# Patient Record
Sex: FEMALE | Race: WHITE | Employment: OTHER | URBAN - NONMETROPOLITAN AREA
[De-identification: names, ages, dates, MRNs, and addresses within clinical notes are randomized per-mention and may not be internally consistent; named-entity substitution may affect disease eponyms.]

---

## 2019-06-20 ENCOUNTER — HOSPITAL ENCOUNTER (OUTPATIENT)
Dept: GENERAL RADIOLOGY | Age: 74
Discharge: HOME | End: 2019-06-20
Payer: MEDICARE

## 2019-06-20 ENCOUNTER — HOSPITAL ENCOUNTER (OUTPATIENT)
Age: 74
Discharge: HOME | End: 2019-06-20
Payer: MEDICARE

## 2019-06-20 DIAGNOSIS — M25.562 PAIN IN BOTH KNEES, UNSPECIFIED CHRONICITY: ICD-10-CM

## 2019-06-20 DIAGNOSIS — M25.561 PAIN IN BOTH KNEES, UNSPECIFIED CHRONICITY: ICD-10-CM

## 2019-06-20 PROCEDURE — 73564 X-RAY EXAM KNEE 4 OR MORE: CPT

## 2020-10-26 ENCOUNTER — HOSPITAL ENCOUNTER (OUTPATIENT)
Dept: GENERAL RADIOLOGY | Age: 75
Discharge: HOME | End: 2020-10-26
Payer: MEDICARE

## 2020-10-26 ENCOUNTER — HOSPITAL ENCOUNTER (OUTPATIENT)
Age: 75
Discharge: HOME | End: 2020-10-26
Payer: MEDICARE

## 2020-10-26 PROCEDURE — 73502 X-RAY EXAM HIP UNI 2-3 VIEWS: CPT

## 2021-06-08 ENCOUNTER — HOSPITAL ENCOUNTER (OUTPATIENT)
Dept: WOMENS IMAGING | Age: 76
Discharge: HOME | End: 2021-06-08
Payer: MEDICARE

## 2021-06-08 DIAGNOSIS — Z12.31 VISIT FOR SCREENING MAMMOGRAM: ICD-10-CM

## 2021-06-08 DIAGNOSIS — Z78.0 POST-MENOPAUSAL: ICD-10-CM

## 2021-06-08 PROCEDURE — 77080 DXA BONE DENSITY AXIAL: CPT

## 2021-06-08 PROCEDURE — 77063 BREAST TOMOSYNTHESIS BI: CPT

## 2022-08-02 ENCOUNTER — HOSPITAL ENCOUNTER (OUTPATIENT)
Dept: MAMMOGRAPHY | Age: 77
Discharge: HOME | End: 2022-08-02
Payer: MEDICARE

## 2022-08-02 DIAGNOSIS — Z12.31 VISIT FOR SCREENING MAMMOGRAM: ICD-10-CM

## 2022-08-02 PROCEDURE — 77063 BREAST TOMOSYNTHESIS BI: CPT

## 2024-01-25 ENCOUNTER — HOSPITAL ENCOUNTER (OUTPATIENT)
Age: 79
Discharge: HOME | End: 2024-01-25
Payer: MEDICARE

## 2024-01-25 ENCOUNTER — HOSPITAL ENCOUNTER (OUTPATIENT)
Dept: GENERAL RADIOLOGY | Age: 79
Discharge: HOME | End: 2024-01-25
Payer: MEDICARE

## 2024-01-25 DIAGNOSIS — M25.561 PAIN IN BOTH KNEES, UNSPECIFIED CHRONICITY: ICD-10-CM

## 2024-01-25 DIAGNOSIS — M25.562 PAIN IN BOTH KNEES, UNSPECIFIED CHRONICITY: ICD-10-CM

## 2024-01-25 PROCEDURE — 73560 X-RAY EXAM OF KNEE 1 OR 2: CPT

## 2024-09-22 ENCOUNTER — HOSPITAL ENCOUNTER (OUTPATIENT)
Age: 79
Setting detail: OBSERVATION
Discharge: HOME | End: 2024-09-23
Attending: STUDENT IN AN ORGANIZED HEALTH CARE EDUCATION/TRAINING PROGRAM | Admitting: INTERNAL MEDICINE
Payer: MEDICARE

## 2024-09-22 ENCOUNTER — APPOINTMENT (OUTPATIENT)
Dept: CT IMAGING | Age: 79
End: 2024-09-22
Payer: MEDICARE

## 2024-09-22 ENCOUNTER — APPOINTMENT (OUTPATIENT)
Dept: GENERAL RADIOLOGY | Age: 79
End: 2024-09-22
Payer: MEDICARE

## 2024-09-22 ENCOUNTER — APPOINTMENT (OUTPATIENT)
Dept: MRI IMAGING | Age: 79
End: 2024-09-22
Payer: MEDICARE

## 2024-09-22 DIAGNOSIS — R47.81 SLURRED SPEECH: Primary | ICD-10-CM

## 2024-09-22 DIAGNOSIS — I63.9 CEREBROVASCULAR ACCIDENT (CVA), UNSPECIFIED MECHANISM (HCC): ICD-10-CM

## 2024-09-22 PROBLEM — R47.1 DYSARTHRIA: Status: ACTIVE | Noted: 2024-09-22

## 2024-09-22 LAB
ALBUMIN SERPL BCG-MCNC: 3.9 G/DL (ref 3.5–5.1)
ALP SERPL-CCNC: 64 U/L (ref 38–126)
ALT SERPL W/O P-5'-P-CCNC: 16 U/L (ref 11–66)
ANION GAP SERPL CALC-SCNC: 15 MEQ/L (ref 8–16)
APTT PPP: 29.8 SECONDS (ref 22–38)
AST SERPL-CCNC: 20 U/L (ref 5–40)
BASOPHILS ABSOLUTE: 0.1 THOU/MM3 (ref 0–0.1)
BASOPHILS NFR BLD AUTO: 0.5 %
BILIRUB SERPL-MCNC: 0.2 MG/DL (ref 0.3–1.2)
BUN SERPL-MCNC: 11 MG/DL (ref 7–22)
CALCIUM SERPL-MCNC: 9 MG/DL (ref 8.5–10.5)
CHLORIDE SERPL-SCNC: 93 MEQ/L (ref 98–111)
CO2 SERPL-SCNC: 22 MEQ/L (ref 23–33)
CREAT SERPL-MCNC: 0.9 MG/DL (ref 0.4–1.2)
DEPRECATED RDW RBC AUTO: 46.6 FL (ref 35–45)
EOSINOPHIL NFR BLD AUTO: 2.4 %
EOSINOPHILS ABSOLUTE: 0.3 THOU/MM3 (ref 0–0.4)
ERYTHROCYTE [DISTWIDTH] IN BLOOD BY AUTOMATED COUNT: 15.2 % (ref 11.5–14.5)
GFR SERPL CREATININE-BSD FRML MDRD: 66 ML/MIN/1.73M2
GLUCOSE BLD STRIP.AUTO-MCNC: 145 MG/DL (ref 70–108)
GLUCOSE SERPL-MCNC: 144 MG/DL (ref 70–108)
HCT VFR BLD AUTO: 36 % (ref 37–47)
HGB BLD-MCNC: 11.5 GM/DL (ref 12–16)
IMM GRANULOCYTES # BLD AUTO: 0.06 THOU/MM3 (ref 0–0.07)
IMM GRANULOCYTES NFR BLD AUTO: 0.5 %
INR PPP: 0.85 (ref 0.85–1.13)
LYMPHOCYTES ABSOLUTE: 2.2 THOU/MM3 (ref 1–4.8)
LYMPHOCYTES NFR BLD AUTO: 18.9 %
MCH RBC QN AUTO: 27.1 PG (ref 26–33)
MCHC RBC AUTO-ENTMCNC: 31.9 GM/DL (ref 32.2–35.5)
MCV RBC AUTO: 84.7 FL (ref 81–99)
MONOCYTES ABSOLUTE: 1 THOU/MM3 (ref 0.4–1.3)
MONOCYTES NFR BLD AUTO: 8.5 %
NEUTROPHILS ABSOLUTE: 8 THOU/MM3 (ref 1.8–7.7)
NEUTROPHILS NFR BLD AUTO: 69.2 %
NRBC BLD AUTO-RTO: 0 /100 WBC
NT-PROBNP SERPL IA-MCNC: 779.7 PG/ML (ref 0–449)
OSMOLALITY SERPL CALC.SUM OF ELEC: 262.7 MOSMOL/KG (ref 275–300)
OSMOLALITY SERPL: 271 MOSMOL/KG (ref 275–295)
PLATELET # BLD AUTO: 413 THOU/MM3 (ref 130–400)
PMV BLD AUTO: 10.2 FL (ref 9.4–12.4)
POTASSIUM SERPL-SCNC: 3.7 MEQ/L (ref 3.5–5.2)
PROT SERPL-MCNC: 6.6 G/DL (ref 6.1–8)
RBC # BLD AUTO: 4.25 MILL/MM3 (ref 4.2–5.4)
SODIUM SERPL-SCNC: 128 MEQ/L (ref 135–145)
SODIUM SERPL-SCNC: 130 MEQ/L (ref 135–145)
TROPONIN, HIGH SENSITIVITY: 8 NG/L (ref 0–12)
WBC # BLD AUTO: 11.5 THOU/MM3 (ref 4.8–10.8)

## 2024-09-22 PROCEDURE — 6370000000 HC RX 637 (ALT 250 FOR IP)

## 2024-09-22 PROCEDURE — 82948 REAGENT STRIP/BLOOD GLUCOSE: CPT

## 2024-09-22 PROCEDURE — 83930 ASSAY OF BLOOD OSMOLALITY: CPT

## 2024-09-22 PROCEDURE — 6370000000 HC RX 637 (ALT 250 FOR IP): Performed by: INTERNAL MEDICINE

## 2024-09-22 PROCEDURE — 99223 1ST HOSP IP/OBS HIGH 75: CPT | Performed by: INTERNAL MEDICINE

## 2024-09-22 PROCEDURE — 85610 PROTHROMBIN TIME: CPT

## 2024-09-22 PROCEDURE — 70498 CT ANGIOGRAPHY NECK: CPT

## 2024-09-22 PROCEDURE — 6360000004 HC RX CONTRAST MEDICATION: Performed by: STUDENT IN AN ORGANIZED HEALTH CARE EDUCATION/TRAINING PROGRAM

## 2024-09-22 PROCEDURE — 2580000003 HC RX 258: Performed by: STUDENT IN AN ORGANIZED HEALTH CARE EDUCATION/TRAINING PROGRAM

## 2024-09-22 PROCEDURE — 96361 HYDRATE IV INFUSION ADD-ON: CPT

## 2024-09-22 PROCEDURE — 71045 X-RAY EXAM CHEST 1 VIEW: CPT

## 2024-09-22 PROCEDURE — 70496 CT ANGIOGRAPHY HEAD: CPT

## 2024-09-22 PROCEDURE — 70551 MRI BRAIN STEM W/O DYE: CPT

## 2024-09-22 PROCEDURE — 84295 ASSAY OF SERUM SODIUM: CPT

## 2024-09-22 PROCEDURE — 70450 CT HEAD/BRAIN W/O DYE: CPT

## 2024-09-22 PROCEDURE — 93005 ELECTROCARDIOGRAM TRACING: CPT | Performed by: STUDENT IN AN ORGANIZED HEALTH CARE EDUCATION/TRAINING PROGRAM

## 2024-09-22 PROCEDURE — G0378 HOSPITAL OBSERVATION PER HR: HCPCS

## 2024-09-22 PROCEDURE — 80053 COMPREHEN METABOLIC PANEL: CPT

## 2024-09-22 PROCEDURE — 85730 THROMBOPLASTIN TIME PARTIAL: CPT

## 2024-09-22 PROCEDURE — 85025 COMPLETE CBC W/AUTO DIFF WBC: CPT

## 2024-09-22 PROCEDURE — 2580000003 HC RX 258

## 2024-09-22 PROCEDURE — 83880 ASSAY OF NATRIURETIC PEPTIDE: CPT

## 2024-09-22 PROCEDURE — 96360 HYDRATION IV INFUSION INIT: CPT

## 2024-09-22 PROCEDURE — 36415 COLL VENOUS BLD VENIPUNCTURE: CPT

## 2024-09-22 PROCEDURE — 99285 EMERGENCY DEPT VISIT HI MDM: CPT

## 2024-09-22 PROCEDURE — 84484 ASSAY OF TROPONIN QUANT: CPT

## 2024-09-22 RX ORDER — SODIUM CHLORIDE 9 MG/ML
INJECTION, SOLUTION INTRAVENOUS CONTINUOUS
Status: DISCONTINUED | OUTPATIENT
Start: 2024-09-22 | End: 2024-09-23

## 2024-09-22 RX ORDER — IOPAMIDOL 755 MG/ML
80 INJECTION, SOLUTION INTRAVASCULAR
Status: COMPLETED | OUTPATIENT
Start: 2024-09-22 | End: 2024-09-22

## 2024-09-22 RX ORDER — ALENDRONATE SODIUM 70 MG/1
70 TABLET ORAL WEEKLY
COMMUNITY
Start: 2024-07-12

## 2024-09-22 RX ORDER — 0.9 % SODIUM CHLORIDE 0.9 %
500 INTRAVENOUS SOLUTION INTRAVENOUS ONCE
Status: DISCONTINUED | OUTPATIENT
Start: 2024-09-22 | End: 2024-09-22

## 2024-09-22 RX ORDER — LISINOPRIL AND HYDROCHLOROTHIAZIDE 12.5; 2 MG/1; MG/1
1 TABLET ORAL DAILY
Status: ON HOLD | COMMUNITY
Start: 2024-07-12 | End: 2024-09-23

## 2024-09-22 RX ORDER — LEVOTHYROXINE SODIUM 50 UG/1
50 TABLET ORAL DAILY
COMMUNITY
Start: 2024-07-12

## 2024-09-22 RX ORDER — CLOPIDOGREL BISULFATE 75 MG/1
150 TABLET ORAL ONCE
Status: COMPLETED | OUTPATIENT
Start: 2024-09-22 | End: 2024-09-22

## 2024-09-22 RX ORDER — 0.9 % SODIUM CHLORIDE 0.9 %
1000 INTRAVENOUS SOLUTION INTRAVENOUS ONCE
Status: COMPLETED | OUTPATIENT
Start: 2024-09-22 | End: 2024-09-22

## 2024-09-22 RX ORDER — ENOXAPARIN SODIUM 100 MG/ML
40 INJECTION SUBCUTANEOUS DAILY
Status: DISCONTINUED | OUTPATIENT
Start: 2024-09-22 | End: 2024-09-23 | Stop reason: HOSPADM

## 2024-09-22 RX ORDER — ZOLPIDEM TARTRATE 10 MG/1
10 TABLET ORAL NIGHTLY PRN
COMMUNITY
Start: 2024-09-09

## 2024-09-22 RX ORDER — ACETAMINOPHEN 325 MG/1
650 TABLET ORAL EVERY 6 HOURS PRN
Status: DISCONTINUED | OUTPATIENT
Start: 2024-09-22 | End: 2024-09-23 | Stop reason: HOSPADM

## 2024-09-22 RX ORDER — ASPIRIN 81 MG/1
324 TABLET, CHEWABLE ORAL ONCE
Status: COMPLETED | OUTPATIENT
Start: 2024-09-22 | End: 2024-09-22

## 2024-09-22 RX ORDER — ATORVASTATIN CALCIUM 40 MG/1
40 TABLET, FILM COATED ORAL NIGHTLY
Status: DISCONTINUED | OUTPATIENT
Start: 2024-09-22 | End: 2024-09-23 | Stop reason: HOSPADM

## 2024-09-22 RX ADMIN — ATORVASTATIN CALCIUM 40 MG: 40 TABLET, FILM COATED ORAL at 19:39

## 2024-09-22 RX ADMIN — SODIUM CHLORIDE 1000 ML: 9 INJECTION, SOLUTION INTRAVENOUS at 07:25

## 2024-09-22 RX ADMIN — ACETAMINOPHEN 650 MG: 325 TABLET ORAL at 23:14

## 2024-09-22 RX ADMIN — ASPIRIN 324 MG: 81 TABLET, CHEWABLE ORAL at 07:25

## 2024-09-22 RX ADMIN — SODIUM CHLORIDE: 9 INJECTION, SOLUTION INTRAVENOUS at 07:24

## 2024-09-22 RX ADMIN — IOPAMIDOL 80 ML: 755 INJECTION, SOLUTION INTRAVENOUS at 06:54

## 2024-09-22 RX ADMIN — CLOPIDOGREL BISULFATE 150 MG: 75 TABLET ORAL at 07:24

## 2024-09-22 ASSESSMENT — PAIN - FUNCTIONAL ASSESSMENT
PAIN_FUNCTIONAL_ASSESSMENT: ACTIVITIES ARE NOT PREVENTED
PAIN_FUNCTIONAL_ASSESSMENT: NONE - DENIES PAIN
PAIN_FUNCTIONAL_ASSESSMENT: NONE - DENIES PAIN

## 2024-09-22 ASSESSMENT — PAIN DESCRIPTION - FREQUENCY: FREQUENCY: INTERMITTENT

## 2024-09-22 ASSESSMENT — PAIN DESCRIPTION - ORIENTATION: ORIENTATION: INNER

## 2024-09-22 ASSESSMENT — PAIN DESCRIPTION - PAIN TYPE: TYPE: ACUTE PAIN

## 2024-09-22 ASSESSMENT — PAIN DESCRIPTION - DESCRIPTORS: DESCRIPTORS: ACHING

## 2024-09-22 ASSESSMENT — PAIN SCALES - GENERAL
PAINLEVEL_OUTOF10: 0
PAINLEVEL_OUTOF10: 0
PAINLEVEL_OUTOF10: 3

## 2024-09-22 ASSESSMENT — PAIN DESCRIPTION - ONSET: ONSET: ON-GOING

## 2024-09-22 ASSESSMENT — PAIN DESCRIPTION - LOCATION: LOCATION: HEAD

## 2024-09-23 VITALS
SYSTOLIC BLOOD PRESSURE: 139 MMHG | BODY MASS INDEX: 31.04 KG/M2 | HEIGHT: 66 IN | HEART RATE: 77 BPM | DIASTOLIC BLOOD PRESSURE: 70 MMHG | OXYGEN SATURATION: 100 % | RESPIRATION RATE: 16 BRPM | TEMPERATURE: 98.6 F | WEIGHT: 193.12 LBS

## 2024-09-23 PROBLEM — G47.00 INSOMNIA: Status: ACTIVE | Noted: 2024-09-23

## 2024-09-23 PROBLEM — R25.1 TREMOR: Status: ACTIVE | Noted: 2024-09-23

## 2024-09-23 PROBLEM — I10 PRIMARY HYPERTENSION: Status: ACTIVE | Noted: 2024-09-23

## 2024-09-23 LAB
ANION GAP SERPL CALC-SCNC: 9 MEQ/L (ref 8–16)
BUN SERPL-MCNC: 8 MG/DL (ref 7–22)
CALCIUM SERPL-MCNC: 8.8 MG/DL (ref 8.5–10.5)
CHLORIDE SERPL-SCNC: 101 MEQ/L (ref 98–111)
CHOLEST SERPL-MCNC: 194 MG/DL (ref 100–199)
CO2 SERPL-SCNC: 23 MEQ/L (ref 23–33)
CREAT SERPL-MCNC: 0.7 MG/DL (ref 0.4–1.2)
DEPRECATED MEAN GLUCOSE BLD GHB EST-ACNC: 114 MG/DL (ref 70–126)
DEPRECATED RDW RBC AUTO: 47.8 FL (ref 35–45)
EKG ATRIAL RATE: 105 BPM
EKG P AXIS: 61 DEGREES
EKG P-R INTERVAL: 168 MS
EKG Q-T INTERVAL: 334 MS
EKG QRS DURATION: 88 MS
EKG QTC CALCULATION (BAZETT): 441 MS
EKG R AXIS: 78 DEGREES
EKG T AXIS: 45 DEGREES
EKG VENTRICULAR RATE: 105 BPM
ERYTHROCYTE [DISTWIDTH] IN BLOOD BY AUTOMATED COUNT: 15.3 % (ref 11.5–14.5)
GFR SERPL CREATININE-BSD FRML MDRD: 89 ML/MIN/1.73M2
GLUCOSE SERPL-MCNC: 89 MG/DL (ref 70–108)
HBA1C MFR BLD HPLC: 5.8 % (ref 4.4–6.4)
HCT VFR BLD AUTO: 33.6 % (ref 37–47)
HDLC SERPL-MCNC: 50 MG/DL
HGB BLD-MCNC: 10.6 GM/DL (ref 12–16)
LDLC SERPL CALC-MCNC: 123 MG/DL
MCH RBC QN AUTO: 27 PG (ref 26–33)
MCHC RBC AUTO-ENTMCNC: 31.5 GM/DL (ref 32.2–35.5)
MCV RBC AUTO: 85.7 FL (ref 81–99)
PLATELET # BLD AUTO: 303 THOU/MM3 (ref 130–400)
PMV BLD AUTO: 10.9 FL (ref 9.4–12.4)
POTASSIUM SERPL-SCNC: 4.2 MEQ/L (ref 3.5–5.2)
RBC # BLD AUTO: 3.92 MILL/MM3 (ref 4.2–5.4)
SODIUM SERPL-SCNC: 133 MEQ/L (ref 135–145)
TRIGL SERPL-MCNC: 104 MG/DL (ref 0–199)
TSH SERPL DL<=0.005 MIU/L-ACNC: 1.36 UIU/ML (ref 0.4–4.2)
WBC # BLD AUTO: 5.6 THOU/MM3 (ref 4.8–10.8)

## 2024-09-23 PROCEDURE — 85027 COMPLETE CBC AUTOMATED: CPT

## 2024-09-23 PROCEDURE — 99239 HOSP IP/OBS DSCHRG MGMT >30: CPT

## 2024-09-23 PROCEDURE — 83036 HEMOGLOBIN GLYCOSYLATED A1C: CPT

## 2024-09-23 PROCEDURE — 36415 COLL VENOUS BLD VENIPUNCTURE: CPT

## 2024-09-23 PROCEDURE — 96361 HYDRATE IV INFUSION ADD-ON: CPT

## 2024-09-23 PROCEDURE — 80061 LIPID PANEL: CPT

## 2024-09-23 PROCEDURE — G0378 HOSPITAL OBSERVATION PER HR: HCPCS

## 2024-09-23 PROCEDURE — 80048 BASIC METABOLIC PNL TOTAL CA: CPT

## 2024-09-23 PROCEDURE — 93010 ELECTROCARDIOGRAM REPORT: CPT | Performed by: INTERNAL MEDICINE

## 2024-09-23 PROCEDURE — 84443 ASSAY THYROID STIM HORMONE: CPT

## 2024-09-23 RX ORDER — CLOPIDOGREL BISULFATE 75 MG/1
75 TABLET ORAL DAILY
Qty: 30 TABLET | Refills: 3 | Status: CANCELLED | OUTPATIENT
Start: 2024-09-23

## 2024-09-23 RX ORDER — LISINOPRIL AND HYDROCHLOROTHIAZIDE 12.5; 2 MG/1; MG/1
1 TABLET ORAL DAILY
Qty: 30 TABLET | Refills: 0 | Status: SHIPPED | OUTPATIENT
Start: 2024-09-23

## 2024-09-23 RX ORDER — ASPIRIN 325 MG
325 TABLET ORAL EVERY 6 HOURS PRN
Status: ON HOLD | COMMUNITY
End: 2024-09-23 | Stop reason: HOSPADM

## 2024-09-23 RX ORDER — ASPIRIN 81 MG/1
81 TABLET ORAL DAILY
Qty: 90 TABLET | Refills: 0 | Status: CANCELLED | OUTPATIENT
Start: 2024-09-23

## 2024-09-23 RX ORDER — ATORVASTATIN CALCIUM 40 MG/1
40 TABLET, FILM COATED ORAL NIGHTLY
Qty: 30 TABLET | Refills: 3 | Status: CANCELLED | OUTPATIENT
Start: 2024-09-23

## 2024-09-23 ASSESSMENT — PAIN SCALES - GENERAL: PAINLEVEL_OUTOF10: 0

## 2024-10-08 ENCOUNTER — TRANSCRIBE ORDERS (OUTPATIENT)
Dept: ADMINISTRATIVE | Age: 79
End: 2024-10-08

## 2024-10-08 DIAGNOSIS — R47.1 DYSARTHRIA: ICD-10-CM

## 2024-10-08 DIAGNOSIS — R55 SYNCOPE, UNSPECIFIED SYNCOPE TYPE: Primary | ICD-10-CM

## 2024-10-28 ENCOUNTER — HOSPITAL ENCOUNTER (OUTPATIENT)
Age: 79
Discharge: HOME | End: 2024-10-30
Attending: FAMILY MEDICINE
Payer: MEDICARE

## 2024-10-28 DIAGNOSIS — R47.1 DYSARTHRIA: ICD-10-CM

## 2024-10-28 DIAGNOSIS — R55 SYNCOPE, UNSPECIFIED SYNCOPE TYPE: ICD-10-CM

## 2024-10-28 LAB
ECHO AO ASC DIAM: 3.9 CM
ECHO AO SINUS VALSALVA DIAM: 3 CM
ECHO AO ST JNCT DIAM: 2.6 CM
ECHO AV CUSP MM: 1.9 CM
ECHO AV MEAN GRADIENT: 2 MMHG
ECHO AV MEAN VELOCITY: 0.7 M/S
ECHO AV PEAK GRADIENT: 5 MMHG
ECHO AV PEAK VELOCITY: 1.1 M/S
ECHO AV VELOCITY RATIO: 0.73
ECHO AV VTI: 23.3 CM
ECHO EST RA PRESSURE: 3 MMHG
ECHO LA AREA 4C: 16.7 CM2
ECHO LA DIAMETER: 3.7 CM
ECHO LA MAJOR AXIS: 4.9 CM
ECHO LA VOL MOD A4C: 42 ML (ref 22–52)
ECHO LV E' LATERAL VELOCITY: 7.5 CM/S
ECHO LV E' SEPTAL VELOCITY: 6.5 CM/S
ECHO LV EDV A4C: 70 ML
ECHO LV EJECTION FRACTION A4C: 49 %
ECHO LV EJECTION FRACTION BIPLANE: 50 % (ref 55–100)
ECHO LV ESV A4C: 36 ML
ECHO LV FRACTIONAL SHORTENING: 23 % (ref 28–44)
ECHO LV INTERNAL DIMENSION DIASTOLIC: 4.8 CM (ref 3.9–5.3)
ECHO LV INTERNAL DIMENSION SYSTOLIC: 3.7 CM
ECHO LV ISOVOLUMETRIC RELAXATION TIME (IVRT): 95 MS
ECHO LV IVSD: 1.2 CM (ref 0.6–0.9)
ECHO LV MASS 2D: 219.1 G (ref 67–162)
ECHO LV POSTERIOR WALL DIASTOLIC: 1.2 CM (ref 0.6–0.9)
ECHO LV RELATIVE WALL THICKNESS RATIO: 0.5
ECHO LVOT AV VTI INDEX: 0.81
ECHO LVOT MEAN GRADIENT: 1 MMHG
ECHO LVOT PEAK GRADIENT: 2 MMHG
ECHO LVOT PEAK VELOCITY: 0.8 M/S
ECHO LVOT VTI: 18.9 CM
ECHO MV A VELOCITY: 0.89 M/S
ECHO MV E DECELERATION TIME (DT): 169 MS
ECHO MV E VELOCITY: 0.53 M/S
ECHO MV E/A RATIO: 0.6
ECHO MV E/E' LATERAL: 7.07
ECHO MV E/E' RATIO (AVERAGED): 7.61
ECHO MV E/E' SEPTAL: 8.15
ECHO MV REGURGITANT PEAK GRADIENT: 52 MMHG
ECHO MV REGURGITANT PEAK VELOCITY: 3.6 M/S
ECHO PV MAX VELOCITY: 0.7 M/S
ECHO PV PEAK GRADIENT: 2 MMHG
ECHO RIGHT VENTRICULAR SYSTOLIC PRESSURE (RVSP): 25 MMHG
ECHO RV FREE WALL PEAK S': 15.9 CM/S
ECHO RV INTERNAL DIMENSION: 3.2 CM
ECHO RV TAPSE: 1.7 CM (ref 1.7–?)
ECHO TV E WAVE: 0.4 M/S
ECHO TV REGURGITANT MAX VELOCITY: 2.36 M/S
ECHO TV REGURGITANT PEAK GRADIENT: 22 MMHG

## 2024-10-28 PROCEDURE — 93306 TTE W/DOPPLER COMPLETE: CPT

## 2024-10-28 PROCEDURE — 93270 REMOTE 30 DAY ECG REV/REPORT: CPT

## 2024-11-22 ENCOUNTER — HOSPITAL ENCOUNTER (OUTPATIENT)
Dept: NUCLEAR MEDICINE | Age: 79
Discharge: HOME | End: 2024-11-22
Attending: FAMILY MEDICINE
Payer: MEDICARE

## 2024-11-22 ENCOUNTER — HOSPITAL ENCOUNTER (OUTPATIENT)
Age: 79
End: 2024-11-22
Attending: FAMILY MEDICINE
Payer: MEDICARE

## 2024-11-22 VITALS — BODY MASS INDEX: 34.55 KG/M2 | HEIGHT: 60 IN | WEIGHT: 176 LBS

## 2024-11-22 DIAGNOSIS — R94.31 ABNORMAL ELECTROCARDIOGRAPHY: ICD-10-CM

## 2024-11-22 LAB
ECHO BSA: 1.84 M2
NUC STRESS EJECTION FRACTION: 48 %
STRESS BASELINE DIAS BP: 71 MMHG
STRESS BASELINE HR: 78 BPM
STRESS BASELINE SYS BP: 152 MMHG
STRESS ESTIMATED WORKLOAD: 1 METS
STRESS PEAK DIAS BP: 69 MMHG
STRESS PEAK SYS BP: 155 MMHG
STRESS PERCENT HR ACHIEVED: 74 %
STRESS POST PEAK HR: 106 BPM
STRESS RATE PRESSURE PRODUCT: NORMAL BPM*MMHG
STRESS STAGE 1 BP: NORMAL MMHG
STRESS STAGE 1 DURATION: 1 MIN:SEC
STRESS STAGE 1 HR: 96 BPM
STRESS STAGE 2 BP: NORMAL MMHG
STRESS STAGE 2 DURATION: 1 MIN:SEC
STRESS STAGE 2 HR: 103 BPM
STRESS STAGE 3 BP: NORMAL MMHG
STRESS STAGE 3 DURATION: 1 MIN:SEC
STRESS STAGE 3 HR: 96 BPM
STRESS STAGE RECOVERY 1 BP: NORMAL MMHG
STRESS STAGE RECOVERY 1 DURATION: 1 MIN:SEC
STRESS STAGE RECOVERY 1 HR: 96 BPM
STRESS STAGE RECOVERY 2 BP: NORMAL MMHG
STRESS STAGE RECOVERY 2 DURATION: 1 MIN:SEC
STRESS STAGE RECOVERY 2 HR: 93 BPM
STRESS STAGE RECOVERY 3 BP: NORMAL MMHG
STRESS STAGE RECOVERY 3 DURATION: 1 MIN:SEC
STRESS STAGE RECOVERY 3 HR: 92 BPM
STRESS STAGE RECOVERY 4 BP: NORMAL MMHG
STRESS STAGE RECOVERY 4 DURATION: 2 MIN:SEC
STRESS STAGE RECOVERY 4 HR: 93 BPM
STRESS TARGET HR: 143 BPM

## 2024-11-22 PROCEDURE — 93017 CV STRESS TEST TRACING ONLY: CPT

## 2024-11-22 PROCEDURE — 3430000000 HC RX DIAGNOSTIC RADIOPHARMACEUTICAL: Performed by: FAMILY MEDICINE

## 2024-11-22 PROCEDURE — 6360000002 HC RX W HCPCS: Performed by: FAMILY MEDICINE

## 2024-11-22 PROCEDURE — 78452 HT MUSCLE IMAGE SPECT MULT: CPT

## 2024-11-22 PROCEDURE — A9500 TC99M SESTAMIBI: HCPCS | Performed by: FAMILY MEDICINE

## 2024-11-22 RX ORDER — TETRAKIS(2-METHOXYISOBUTYLISOCYANIDE)COPPER(I) TETRAFLUOROBORATE 1 MG/ML
10 INJECTION, POWDER, LYOPHILIZED, FOR SOLUTION INTRAVENOUS
Status: COMPLETED | OUTPATIENT
Start: 2024-11-22 | End: 2024-11-22

## 2024-11-22 RX ORDER — TETRAKIS(2-METHOXYISOBUTYLISOCYANIDE)COPPER(I) TETRAFLUOROBORATE 1 MG/ML
10 INJECTION, POWDER, LYOPHILIZED, FOR SOLUTION INTRAVENOUS
Status: DISCONTINUED | OUTPATIENT
Start: 2024-11-22 | End: 2024-11-22

## 2024-11-22 RX ORDER — TETRAKIS(2-METHOXYISOBUTYLISOCYANIDE)COPPER(I) TETRAFLUOROBORATE 1 MG/ML
32.3 INJECTION, POWDER, LYOPHILIZED, FOR SOLUTION INTRAVENOUS
Status: COMPLETED | OUTPATIENT
Start: 2024-11-22 | End: 2024-11-22

## 2024-11-22 RX ORDER — TETRAKIS(2-METHOXYISOBUTYLISOCYANIDE)COPPER(I) TETRAFLUOROBORATE 1 MG/ML
32.3 INJECTION, POWDER, LYOPHILIZED, FOR SOLUTION INTRAVENOUS
Status: DISCONTINUED | OUTPATIENT
Start: 2024-11-22 | End: 2024-11-22

## 2024-11-22 RX ORDER — REGADENOSON 0.08 MG/ML
0.4 INJECTION, SOLUTION INTRAVENOUS
Status: COMPLETED | OUTPATIENT
Start: 2024-11-22 | End: 2024-11-22

## 2024-11-22 RX ADMIN — Medication 32.3 MILLICURIE: at 10:37

## 2024-11-22 RX ADMIN — REGADENOSON 0.4 MG: 0.08 INJECTION, SOLUTION INTRAVENOUS at 10:35

## 2024-11-22 RX ADMIN — Medication 10 MILLICURIE: at 09:40

## 2024-12-10 ENCOUNTER — HOSPITAL ENCOUNTER (OUTPATIENT)
Dept: PHYSICAL THERAPY | Age: 79
Setting detail: THERAPIES SERIES
Discharge: HOME | End: 2024-12-10
Payer: MEDICARE

## 2024-12-10 PROCEDURE — 97161 PT EVAL LOW COMPLEX 20 MIN: CPT

## 2024-12-10 ASSESSMENT — KOOS JR
STANDING UPRIGHT: MILD
TWISING OR PIVOTING ON KNEE: SEVERE
HOW SEVERE IS YOUR KNEE STIFFNESS AFTER FIRST WAKING IN MORNING: SEVERE
STRAIGHTENING KNEE FULLY: MODERATE
BENDING TO THE FLOOR TO PICK UP OBJECT: SEVERE
RISING FROM SITTING: MODERATE
KOOS JR TOTAL INTERVAL SCORE: 42.281
GOING UP OR DOWN STAIRS: EXTREME

## 2024-12-10 NOTE — PROGRESS NOTES
* PLEASE SIGN, DATE AND TIME CERTIFICATION BELOW AND RETURN TO [facility] OUTPATIENT REHABILITATION (FAX #: [phone]).  ATTEST/CO-SIGN IF ACCESSING VIA INBASKET.  THANK YOU.**    I certify that I have examined the patient below and determined that Physical Medicine and Rehabilitation service is necessary and that I approve the established plan of care for up to 90 days or as specifically noted.  Attestation, signature or co-signature of physician indicates approval of certification requirements.    ________________________ ____________  Physician Signature   Date   [facility]  PHYSICAL THERAPY  [x] EVALUATION  [] DAILY NOTE (LAND) [] DAILY NOTE (AQUATIC ) [] PROGRESS NOTE [] DISCHARGE NOTE    [] OUTPATIENT REHABILITATION [facility]   [] [facility]    [] [facility]   [x] [facility]    Date: 12/10/2024  Patient Name:  Tami Garcia  : 1945  MRN: [id number]  CSN: [id number]    Referring Practitioner Maximus North MD [id number]      Diagnosis  Diagnoses       Free Text    TKR                    Treatment Diagnosis M25.561  Right Knee Pain  M25.661 Stiffness of right knee, not elsewhere classified  Z47.1 Aftercare following joint replacement surgery   Date of Evaluation 12/10/24   Additional Pertinent History Tami Garcia has no past medical history on file.  she has a past surgical history that includes Hysterectomy () and Ovary removal ().     Allergies No Known Allergies   Medications   Current Outpatient Medications:     lisinopril-hydroCHLOROthiazide (PRINZIDE;ZESTORETIC) 20-12.5 MG per tablet, Take 1 tablet by mouth daily, Disp: 30 tablet, Rfl: 0    alendronate (FOSAMAX) 70 MG tablet, Take 1 tablet by mouth Once a week at 5 PM On Wednesday, Disp: , Rfl:     levothyroxine (SYNTHROID) 50 MCG tablet, Take 1 tablet by mouth daily, Disp: , Rfl:     zolpidem (AMBIEN) 10 MG tablet, Take 1 tablet by mouth nightly as needed for Sleep.,

## 2024-12-13 ENCOUNTER — HOSPITAL ENCOUNTER (OUTPATIENT)
Dept: PHYSICAL THERAPY | Age: 79
Setting detail: THERAPIES SERIES
Discharge: HOME | End: 2024-12-13
Payer: MEDICARE

## 2024-12-13 PROCEDURE — 97140 MANUAL THERAPY 1/> REGIONS: CPT

## 2024-12-13 PROCEDURE — 97110 THERAPEUTIC EXERCISES: CPT

## 2024-12-13 NOTE — PROGRESS NOTES
[facility]  PHYSICAL THERAPY  [] EVALUATION  [x] DAILY NOTE (LAND) [] DAILY NOTE (AQUATIC ) [] PROGRESS NOTE [] DISCHARGE NOTE    [] OUTPATIENT REHABILITATION CENTER [facility]   [] [place] [facility]    [] [facility]   [x] [facility]    Date: 2024  Patient Name:  Tami Garcia  : 1945  MRN: [id number]  CSN: [id number]    Referring Practitioner Maximus North MD [id number]      Diagnosis  Diagnoses       Free Text    TKR                    Treatment Diagnosis M25.561  Right Knee Pain  M25.661 Stiffness of right knee, not elsewhere classified  Z47.1 Aftercare following joint replacement surgery   Date of Evaluation 12/10/24   Additional Pertinent History Tami Garcia has no past medical history on file.  she has a past surgical history that includes Hysterectomy () and Ovary removal ().     Allergies No Known Allergies   Medications   Current Outpatient Medications:     lisinopril-hydroCHLOROthiazide (PRINZIDE;ZESTORETIC) 20-12.5 MG per tablet, Take 1 tablet by mouth daily, Disp: 30 tablet, Rfl: 0    alendronate (FOSAMAX) 70 MG tablet, Take 1 tablet by mouth Once a week at 5 PM On Wednesday, Disp: , Rfl:     levothyroxine (SYNTHROID) 50 MCG tablet, Take 1 tablet by mouth daily, Disp: , Rfl:     zolpidem (AMBIEN) 10 MG tablet, Take 1 tablet by mouth nightly as needed for Sleep., Disp: , Rfl:     SODIUM CHLORIDE PO, Take 1,000 mg by mouth daily, Disp: , Rfl:       Functional Outcome Measure Used KOOS    Functional Outcome Score 18 (12/10/24)       Insurance: Primary: Payor: [employer] MEDICARE /  /  / ,   Secondary:    Authorization Information PATIENT RESPONSIBILITY AND/OR CO-PAY: $40 copay per visit  SECONDARY INSURANCE COMPANY:  NA      PRE CERTIFICATION REQUIRED: Precert required after initial evaluation.  INSURANCE THERAPY BENEFIT: Visits approved based on medical necessity.. .   AQUATIC THERAPY COVERED: Yes  MODALITIES COVERED:  Yes

## 2024-12-17 ENCOUNTER — HOSPITAL ENCOUNTER (OUTPATIENT)
Dept: PHYSICAL THERAPY | Age: 79
Setting detail: THERAPIES SERIES
Discharge: HOME | End: 2024-12-17
Payer: MEDICARE

## 2024-12-17 PROCEDURE — 97110 THERAPEUTIC EXERCISES: CPT

## 2024-12-17 PROCEDURE — 97140 MANUAL THERAPY 1/> REGIONS: CPT

## 2024-12-17 NOTE — PROGRESS NOTES
[facility]  PHYSICAL THERAPY  [] EVALUATION  [x] DAILY NOTE (LAND) [] DAILY NOTE (AQUATIC ) [] PROGRESS NOTE [] DISCHARGE NOTE    [] OUTPATIENT REHABILITATION CENTER [facility]   [] [place] [facility]    [] [facility]   [x] [facility]    Date: 2024  Patient Name:  Tami Garcia  : 1945  MRN: [id number]  CSN: [id number]    Referring Practitioner Maximus North MD [id number]      Diagnosis  Diagnoses       Free Text    TKR                    Treatment Diagnosis M25.561  Right Knee Pain  M25.661 Stiffness of right knee, not elsewhere classified  Z47.1 Aftercare following joint replacement surgery   Date of Evaluation 12/10/24   Additional Pertinent History Tami Garcia has no past medical history on file.  she has a past surgical history that includes Hysterectomy () and Ovary removal ().     Allergies No Known Allergies   Medications   Current Outpatient Medications:     lisinopril-hydroCHLOROthiazide (PRINZIDE;ZESTORETIC) 20-12.5 MG per tablet, Take 1 tablet by mouth daily, Disp: 30 tablet, Rfl: 0    alendronate (FOSAMAX) 70 MG tablet, Take 1 tablet by mouth Once a week at 5 PM On Wednesday, Disp: , Rfl:     levothyroxine (SYNTHROID) 50 MCG tablet, Take 1 tablet by mouth daily, Disp: , Rfl:     zolpidem (AMBIEN) 10 MG tablet, Take 1 tablet by mouth nightly as needed for Sleep., Disp: , Rfl:     SODIUM CHLORIDE PO, Take 1,000 mg by mouth daily, Disp: , Rfl:       Functional Outcome Measure Used KOOS    Functional Outcome Score 18 (12/10/24)       Insurance: Primary: Payor: [employer] MEDICARE /  /  / ,   Secondary:    Authorization Information PATIENT RESPONSIBILITY AND/OR CO-PAY: $40 copay per visit  SECONDARY INSURANCE COMPANY:  NA      PRE CERTIFICATION REQUIRED: Precert required after initial evaluation.  INSURANCE THERAPY BENEFIT: Visits approved based on medical necessity.. .   AQUATIC THERAPY COVERED: Yes  MODALITIES COVERED:  Yes

## 2024-12-20 ENCOUNTER — HOSPITAL ENCOUNTER (OUTPATIENT)
Dept: PHYSICAL THERAPY | Age: 79
Setting detail: THERAPIES SERIES
Discharge: HOME | End: 2024-12-20
Payer: MEDICARE

## 2024-12-20 PROCEDURE — 97110 THERAPEUTIC EXERCISES: CPT

## 2024-12-20 PROCEDURE — 97140 MANUAL THERAPY 1/> REGIONS: CPT

## 2024-12-20 NOTE — PROGRESS NOTES
[facility]  PHYSICAL THERAPY  [] EVALUATION  [x] DAILY NOTE (LAND) [] DAILY NOTE (AQUATIC ) [] PROGRESS NOTE [] DISCHARGE NOTE    [] OUTPATIENT REHABILITATION CENTER [facility]   [] [place] [facility]    [] [facility]   [x] [facility]    Date: 2024  Patient Name:  Tami Garcia  : 1945  MRN: [id number]  CSN: [id number]    Referring Practitioner Maximus North MD [id number]      Diagnosis  Diagnoses       Free Text    TKR                    Treatment Diagnosis M25.561  Right Knee Pain  M25.661 Stiffness of right knee, not elsewhere classified  Z47.1 Aftercare following joint replacement surgery   Date of Evaluation 12/10/24   Additional Pertinent History Tami Garcia has no past medical history on file.  she has a past surgical history that includes Hysterectomy () and Ovary removal ().     Allergies No Known Allergies   Medications   Current Outpatient Medications:     lisinopril-hydroCHLOROthiazide (PRINZIDE;ZESTORETIC) 20-12.5 MG per tablet, Take 1 tablet by mouth daily, Disp: 30 tablet, Rfl: 0    alendronate (FOSAMAX) 70 MG tablet, Take 1 tablet by mouth Once a week at 5 PM On Wednesday, Disp: , Rfl:     levothyroxine (SYNTHROID) 50 MCG tablet, Take 1 tablet by mouth daily, Disp: , Rfl:     zolpidem (AMBIEN) 10 MG tablet, Take 1 tablet by mouth nightly as needed for Sleep., Disp: , Rfl:     SODIUM CHLORIDE PO, Take 1,000 mg by mouth daily, Disp: , Rfl:       Functional Outcome Measure Used KOOS    Functional Outcome Score 18 (12/10/24)       Insurance: Primary: Payor: [employer] MEDICARE /  /  / ,   Secondary:    Authorization Information PATIENT RESPONSIBILITY AND/OR CO-PAY: $40 copay per visit  SECONDARY INSURANCE COMPANY:  NA      PRE CERTIFICATION REQUIRED: Precert required after initial evaluation.  INSURANCE THERAPY BENEFIT: Visits approved based on medical necessity.. .   AQUATIC THERAPY COVERED: Yes  MODALITIES COVERED:  Yes

## 2024-12-23 ENCOUNTER — HOSPITAL ENCOUNTER (OUTPATIENT)
Dept: PHYSICAL THERAPY | Age: 79
Setting detail: THERAPIES SERIES
Discharge: HOME | End: 2024-12-23
Payer: MEDICARE

## 2024-12-23 PROCEDURE — 97110 THERAPEUTIC EXERCISES: CPT

## 2024-12-23 NOTE — PROGRESS NOTES
REFERENCE NUMBER: [id number]    Initial CPT Codes Requested 72510 - Therapeutic Exercise, 64461 - Manual Therapy , 52225 - Neuromuscular Re-Education , 41429 - Therapeutic Activities, and 59549 - Ultrasound   Received auth for 10 PT visits for CPT codes 86968, 63699, 70184, 53761 and 30363 from 12/13/24 through 1/22/25.     Authorization #[id number]  Tracking #[id number]       Progress Note Counter 5/10 for progress note (Reporting Period: 12/10 to     )   Recertification Date 02/18/25   Physician Follow-Up December 17, 24   Physician Orders    History of Present Illness Tami is referred to PT 6 days s/p right TKA. She was discharged home on Thursday. Prior to surgery she was walking with a SPC, using FWW only when desperate.     She was discharged home with FWW . Since returning home she has had more pain and stiffness but it's getting better every day.     SUBJECTIVE: took pain meds prior to PT.  She continues to ice.        TREATMENT   Precautions:    Pain: 5/10 R knee    \"X” in shaded column indicates activity completed today    “*\" next to exercise/intervention indicates progression   Modalities Parameters/  Location  Notes   Ice  10 min  While scheduling          Rock tape  2 strips  Lymphatic taping   Manual Therapy Time/Technique  Notes   Efflurage to R quad, hamstring, surrounding knee 10 min. x Leg on 2 pillows,                Exercise/Intervention   Notes   NuStep arm 9, seat 5min.  Level 5 x    Bike  5 min Seat 5     // bars: anterior/posterior wt shifts; marches; squats  15*x airex x Cues to keep weight on heels   3 way hip  15* B Airex* x           Supine: quad sets, glut sets, ankle pumps 15x*  x    SAQ 15*x  x    SLR 15*  x    Supine hip abduction 15*x  x    Heel slides  15* reps with slide board   x           Step ups  10x 4 inch     Sit to stand  10x                    LAQ 10x  x Held 2-3 sec at end range    Hamstring  flex  Blue* 15*   X     Sitting Marching 15x  x                  AROM of R knee

## 2024-12-26 ENCOUNTER — HOSPITAL ENCOUNTER (OUTPATIENT)
Dept: PHYSICAL THERAPY | Age: 79
Setting detail: THERAPIES SERIES
Discharge: HOME | End: 2024-12-26
Payer: MEDICARE

## 2024-12-26 PROCEDURE — 97110 THERAPEUTIC EXERCISES: CPT

## 2024-12-26 PROCEDURE — 97140 MANUAL THERAPY 1/> REGIONS: CPT

## 2024-12-26 NOTE — PROGRESS NOTES
REFERENCE NUMBER: [id number]    Initial CPT Codes Requested 21805 - Therapeutic Exercise, 71009 - Manual Therapy , 36528 - Neuromuscular Re-Education , 27007 - Therapeutic Activities, and 03781 - Ultrasound   Received auth for 10 PT visits for CPT codes 31457, 94864, 80156, 52128 and 66872 from 12/13/24 through 1/22/25.     Authorization #[id number]  Tracking #[id number]       Progress Note Counter 6/10 for progress note (Reporting Period: 12/10 to     )   Recertification Date 02/18/25   Physician Follow-Up December 17, 24   Physician Orders    History of Present Illness Tami is referred to PT 6 days s/p right TKA. She was discharged home on Thursday. Prior to surgery she was walking with a SPC, using FWW only when desperate.     She was discharged home with FWW . Since returning home she has had more pain and stiffness but it's getting better every day.     SUBJECTIVE: Pt stated she is doing pretty well. Pt is sleeping well at night. Pt using SC to walk into clinic.        TREATMENT   Precautions:    Pain: 3-4/10 R knee    \"X” in shaded column indicates activity completed today    “*\" next to exercise/intervention indicates progression   Modalities Parameters/  Location  Notes   Ice  10 min  While scheduling          Rock tape  2 strips  Lymphatic taping   Manual Therapy Time/Technique  Notes   Efflurage to R quad, hamstring, surrounding knee 10 min. x Leg on 2 pillows,    Patellar mobs 10x x          Exercise/Intervention   Notes   NuStep arm 9, seat 6 min.  Level 5 x    Bike  5 min Seat 5     Dynamic gait: forward, marching, retro, side stepping 2laps  x    // bars: Airex: anterior/posterior wt shifts; marches; squats  15x  x Cues to keep weight on heels   3 way hip  15 B  x           Supine: quad sets with mild overpressure, glut sets, ankle pumps 15x  x    SAQ 15x 5 sec hold x    SLR 10  x Quad lag     Supine hip abduction 15x  x    Heel slides  15x   x           Step ups  10x 4 inch x    Step stretches: HS,

## 2024-12-30 ENCOUNTER — HOSPITAL ENCOUNTER (OUTPATIENT)
Dept: PHYSICAL THERAPY | Age: 79
Setting detail: THERAPIES SERIES
Discharge: HOME | End: 2024-12-30
Payer: MEDICARE

## 2024-12-30 PROCEDURE — 97110 THERAPEUTIC EXERCISES: CPT

## 2024-12-30 PROCEDURE — 97140 MANUAL THERAPY 1/> REGIONS: CPT

## 2024-12-30 NOTE — PROGRESS NOTES
[facility]  PHYSICAL THERAPY  [] EVALUATION  [x] DAILY NOTE (LAND) [] DAILY NOTE (AQUATIC ) [] PROGRESS NOTE [] DISCHARGE NOTE    [] OUTPATIENT REHABILITATION CENTER [facility]   [] [place] [facility]    [] [facility]   [x] [facility]    Date: 2024  Patient Name:  Tami Garcia  : 1945  MRN: [id number]  CSN: [id number]    Referring Practitioner Maximus North MD [id number]      Diagnosis  Diagnoses       Free Text    TKR                    Treatment Diagnosis M25.561  Right Knee Pain  M25.661 Stiffness of right knee, not elsewhere classified  Z47.1 Aftercare following joint replacement surgery   Date of Evaluation 12/10/24   Additional Pertinent History Tami Garcia has no past medical history on file.  she has a past surgical history that includes Hysterectomy () and Ovary removal ().     Allergies No Known Allergies   Medications   Current Outpatient Medications:     lisinopril-hydroCHLOROthiazide (PRINZIDE;ZESTORETIC) 20-12.5 MG per tablet, Take 1 tablet by mouth daily, Disp: 30 tablet, Rfl: 0    alendronate (FOSAMAX) 70 MG tablet, Take 1 tablet by mouth Once a week at 5 PM On Wednesday, Disp: , Rfl:     levothyroxine (SYNTHROID) 50 MCG tablet, Take 1 tablet by mouth daily, Disp: , Rfl:     zolpidem (AMBIEN) 10 MG tablet, Take 1 tablet by mouth nightly as needed for Sleep., Disp: , Rfl:     SODIUM CHLORIDE PO, Take 1,000 mg by mouth daily, Disp: , Rfl:       Functional Outcome Measure Used KOOS    Functional Outcome Score 18 (12/10/24)       Insurance: Primary: Payor: [employer] MEDICARE /  /  / ,   Secondary:    Authorization Information PATIENT RESPONSIBILITY AND/OR CO-PAY: $40 copay per visit  SECONDARY INSURANCE COMPANY:  NA      PRE CERTIFICATION REQUIRED: Precert required after initial evaluation.  INSURANCE THERAPY BENEFIT: Visits approved based on medical necessity.. .   AQUATIC THERAPY COVERED: Yes  MODALITIES COVERED:  Yes

## 2025-01-02 ENCOUNTER — HOSPITAL ENCOUNTER (OUTPATIENT)
Dept: PHYSICAL THERAPY | Age: 80
Setting detail: THERAPIES SERIES
Discharge: HOME | End: 2025-01-02
Payer: MEDICARE

## 2025-01-02 PROCEDURE — 97110 THERAPEUTIC EXERCISES: CPT

## 2025-01-02 PROCEDURE — 97140 MANUAL THERAPY 1/> REGIONS: CPT

## 2025-01-02 NOTE — PROGRESS NOTES
pain, on both even and uneven surfaces, so she may return to fishing without limitation.      Patient Education:   [x]  HEP/Education Completed: Plan of Care, Goals, rock tape, quad sets as often as possible, step stretches HS stretch, prone hang  Medbridge Access Code:  []  No new Education completed  []  Reviewed Prior HEP      [x]  Patient verbalized and/or demonstrated understanding of education provided.  []  Patient unable to verbalize and/or demonstrate understanding of education provided.  Will continue education.  [x]  Barriers to learning: n/a    PLAN:  Treatment Recommendations: Strengthening, Range of Motion, Balance Training, Endurance Training, Gait Training, Stair Training, Neuromuscular Re-education, Manual Therapy - Soft Tissue Mobilization, Manual Therapy - Joint Manipulation, Pain Management, Home Exercise Program, Patient Education, and Modalities    []  Plan of care initiated.  Plan to see patient 2 times per week for 8 weeks to address the treatment planned outlined above.  [x]  Continue with current plan of care  []  Modify plan of care as follows:    []  Hold pending physician visit  []  Discharge    Time In 0845   Time Out 0925   Timed Code Minutes: 40 min   Total Treatment Time: 40 min       Electronically Signed by: Aditi Winn PTA

## 2025-01-07 ENCOUNTER — HOSPITAL ENCOUNTER (OUTPATIENT)
Dept: PHYSICAL THERAPY | Age: 80
Setting detail: THERAPIES SERIES
Discharge: HOME | End: 2025-01-07
Payer: MEDICARE

## 2025-01-07 PROCEDURE — 97110 THERAPEUTIC EXERCISES: CPT

## 2025-01-07 PROCEDURE — 97530 THERAPEUTIC ACTIVITIES: CPT

## 2025-01-07 NOTE — PROGRESS NOTES
[facility]  PHYSICAL THERAPY  [] EVALUATION  [x] DAILY NOTE (LAND) [] DAILY NOTE (AQUATIC ) [] PROGRESS NOTE [] DISCHARGE NOTE    [] OUTPATIENT REHABILITATION CENTER [facility]   [] [place] [facility]    [] [facility]   [x] [facility]    Date: 2025  Patient Name:  Tami Garcia  : 1945  MRN: [id number]  CSN: [id number]    Referring Practitioner Maximus North MD [id number]      Diagnosis  Diagnoses       Free Text    TKR                    Treatment Diagnosis M25.561  Right Knee Pain  M25.661 Stiffness of right knee, not elsewhere classified  Z47.1 Aftercare following joint replacement surgery   Date of Evaluation 12/10/24   Additional Pertinent History Tami Garcia has no past medical history on file.  she has a past surgical history that includes Hysterectomy () and Ovary removal ().     Allergies No Known Allergies   Medications   Current Outpatient Medications:     lisinopril-hydroCHLOROthiazide (PRINZIDE;ZESTORETIC) 20-12.5 MG per tablet, Take 1 tablet by mouth daily, Disp: 30 tablet, Rfl: 0    alendronate (FOSAMAX) 70 MG tablet, Take 1 tablet by mouth Once a week at 5 PM On Wednesday, Disp: , Rfl:     levothyroxine (SYNTHROID) 50 MCG tablet, Take 1 tablet by mouth daily, Disp: , Rfl:     zolpidem (AMBIEN) 10 MG tablet, Take 1 tablet by mouth nightly as needed for Sleep., Disp: , Rfl:     SODIUM CHLORIDE PO, Take 1,000 mg by mouth daily, Disp: , Rfl:       Functional Outcome Measure Used KOOS    Functional Outcome Score 18 (12/10/24)       Insurance: Primary: Payor: [employer] MEDICARE /  /  / ,   Secondary:    Authorization Information PATIENT RESPONSIBILITY AND/OR CO-PAY: $40 copay per visit  SECONDARY INSURANCE COMPANY:  NA      PRE CERTIFICATION REQUIRED: Precert required after initial evaluation.  INSURANCE THERAPY BENEFIT: Visits approved based on medical necessity.. .   AQUATIC THERAPY COVERED: Yes  MODALITIES COVERED:  Yes     REFERENCE

## 2025-01-09 ENCOUNTER — HOSPITAL ENCOUNTER (OUTPATIENT)
Dept: PHYSICAL THERAPY | Age: 80
Setting detail: THERAPIES SERIES
Discharge: HOME | End: 2025-01-09
Payer: MEDICARE

## 2025-01-09 PROCEDURE — 97110 THERAPEUTIC EXERCISES: CPT

## 2025-01-09 PROCEDURE — 97530 THERAPEUTIC ACTIVITIES: CPT

## 2025-01-09 ASSESSMENT — KOOS JR
RISING FROM SITTING: MILD
KOOS JR TOTAL INTERVAL SCORE: 63.776
STRAIGHTENING KNEE FULLY: MODERATE
GOING UP OR DOWN STAIRS: SEVERE
TWISING OR PIVOTING ON KNEE: MILD
HOW SEVERE IS YOUR KNEE STIFFNESS AFTER FIRST WAKING IN MORNING: MODERATE

## 2025-01-09 NOTE — PROGRESS NOTES
* PLEASE SIGN, DATE AND TIME CERTIFICATION BELOW AND RETURN TO [facility] OUTPATIENT REHABILITATION (FAX #: [phone]).  ATTEST/CO-SIGN IF ACCESSING VIA INBASKET.  THANK YOU.**    I certify that I have examined the patient below and determined that Physical Medicine and Rehabilitation service is necessary and that I approve the established plan of care for up to 90 days or as specifically noted.  Attestation, signature or co-signature of physician indicates approval of certification requirements.    ________________________ ____________  Physician Signature   Date     [facility]  PHYSICAL THERAPY  [] EVALUATION  [x] DAILY NOTE (LAND) [] DAILY NOTE (AQUATIC ) [x] PROGRESS NOTE [] DISCHARGE NOTE    [] OUTPATIENT REHABILITATION [facility]   [] [facility]    [] [facility]   [x] [facility]    Date: 2025  Patient Name:  aTmi Garcia  : 1945  MRN: [id number]  CSN: [id number]    Referring Practitioner Maximus North MD [id number]      Diagnosis  Diagnoses       Free Text    TKR                    Treatment Diagnosis M25.561  Right Knee Pain  M25.661 Stiffness of right knee, not elsewhere classified  Z47.1 Aftercare following joint replacement surgery   Date of Evaluation 12/10/24   Additional Pertinent History Tami Garcia has no past medical history on file.  she has a past surgical history that includes Hysterectomy () and Ovary removal ().     Allergies No Known Allergies   Medications   Current Outpatient Medications:     lisinopril-hydroCHLOROthiazide (PRINZIDE;ZESTORETIC) 20-12.5 MG per tablet, Take 1 tablet by mouth daily, Disp: 30 tablet, Rfl: 0    alendronate (FOSAMAX) 70 MG tablet, Take 1 tablet by mouth Once a week at 5 PM On Wednesday, Disp: , Rfl:     levothyroxine (SYNTHROID) 50 MCG tablet, Take 1 tablet by mouth daily, Disp: , Rfl:     zolpidem (AMBIEN) 10 MG tablet, Take 1 tablet by mouth nightly as needed for Sleep.,

## 2025-01-14 ENCOUNTER — HOSPITAL ENCOUNTER (OUTPATIENT)
Dept: PHYSICAL THERAPY | Age: 80
Setting detail: THERAPIES SERIES
Discharge: HOME | End: 2025-01-14
Payer: MEDICARE

## 2025-01-14 PROCEDURE — 97530 THERAPEUTIC ACTIVITIES: CPT

## 2025-01-14 NOTE — PROGRESS NOTES
[facility]  PHYSICAL THERAPY  [] EVALUATION  [x] DAILY NOTE (LAND) [] DAILY NOTE (AQUATIC ) [] PROGRESS NOTE [] DISCHARGE NOTE    [] OUTPATIENT REHABILITATION CENTER [facility]   [] [place] [facility]    [] [facility]   [x] [facility]    Date: 2025  Patient Name:  Tami Garcia  : 1945  MRN: [id number]  CSN: [id number]    Referring Practitioner Maximus North MD [id number]      Diagnosis  Diagnoses       Free Text    TKR                    Treatment Diagnosis M25.561  Right Knee Pain  M25.661 Stiffness of right knee, not elsewhere classified  Z47.1 Aftercare following joint replacement surgery   Date of Evaluation 12/10/24   Additional Pertinent History Tami Garcia has no past medical history on file.  she has a past surgical history that includes Hysterectomy () and Ovary removal ().     Allergies No Known Allergies   Medications   Current Outpatient Medications:     lisinopril-hydroCHLOROthiazide (PRINZIDE;ZESTORETIC) 20-12.5 MG per tablet, Take 1 tablet by mouth daily, Disp: 30 tablet, Rfl: 0    alendronate (FOSAMAX) 70 MG tablet, Take 1 tablet by mouth Once a week at 5 PM On Wednesday, Disp: , Rfl:     levothyroxine (SYNTHROID) 50 MCG tablet, Take 1 tablet by mouth daily, Disp: , Rfl:     zolpidem (AMBIEN) 10 MG tablet, Take 1 tablet by mouth nightly as needed for Sleep., Disp: , Rfl:     SODIUM CHLORIDE PO, Take 1,000 mg by mouth daily, Disp: , Rfl:       Functional Outcome Measure Used KOOS Jr   Functional Outcome Score 18 (12/10/24)   9 (25)      Insurance: Primary: Payor: [employer] MEDICARE /  /  / ,   Secondary:    Authorization Information PATIENT RESPONSIBILITY AND/OR CO-PAY: $40 copay per visit  SECONDARY INSURANCE COMPANY:  NA      PRE CERTIFICATION REQUIRED: Precert required after initial evaluation.  INSURANCE THERAPY BENEFIT: Visits approved based on medical necessity.. .   AQUATIC THERAPY COVERED: Yes  MODALITIES COVERED:

## 2025-01-14 NOTE — THERAPY DISCHARGE
[facility]  PHYSICAL THERAPY OUTPATIENT QUICK DISCHARGE NOTE  [facility]    Date: 2025  Patient Name:  Tami Garcia  : 1945  MRN: [id number]  [facility]: [id number]    Referring Practitioner Maximus North MD [id number]      Diagnosis Status post total knee replacement  Pain in right knee  Stiffness of right knee, not elsewhere classified  Aftercare following joint replacement surgery       Functional Outcome Measure Used KOOS Jr   Functional Outcome Score 18 (12/10/24)   9 (25)         Patient is discharged from Physical Therapy services at this time.  See last note for details related to results of therapy and goal achievement.    Reason for discharge:  Dr. North is pleased with her progress and has released her from therapy. She will follow up with him again in a month before being cleared to return in a year . Tami uses her SPC in inclement weather; overall she is doing great!    Thea Castaneda, PT

## 2025-01-16 ENCOUNTER — APPOINTMENT (OUTPATIENT)
Dept: PHYSICAL THERAPY | Age: 80
End: 2025-01-16
Payer: MEDICARE